# Patient Record
Sex: MALE | Race: OTHER | Employment: FULL TIME | URBAN - METROPOLITAN AREA
[De-identification: names, ages, dates, MRNs, and addresses within clinical notes are randomized per-mention and may not be internally consistent; named-entity substitution may affect disease eponyms.]

---

## 2018-02-25 ENCOUNTER — HOSPITAL ENCOUNTER (EMERGENCY)
Age: 23
Discharge: HOME | End: 2018-02-25
Attending: FAMILY MEDICINE

## 2018-02-25 VITALS
BODY MASS INDEX: 28.23 KG/M2 | TEMPERATURE: 98.3 F | DIASTOLIC BLOOD PRESSURE: 77 MMHG | SYSTOLIC BLOOD PRESSURE: 155 MMHG | WEIGHT: 220 LBS | OXYGEN SATURATION: 99 % | HEIGHT: 74 IN | HEART RATE: 74 BPM | RESPIRATION RATE: 16 BRPM

## 2018-02-25 DIAGNOSIS — R07.89 CHEST WALL PAIN: Primary | ICD-10-CM

## 2018-02-25 LAB
ALBUMIN SERPL-MCNC: 4.7 G/DL (ref 3.9–4.9)
ALP BLD-CCNC: 79 U/L (ref 35–104)
ALT SERPL-CCNC: 16 U/L (ref 0–41)
ANION GAP SERPL CALCULATED.3IONS-SCNC: 11 MEQ/L (ref 7–13)
ANISOCYTOSIS: ABNORMAL
AST SERPL-CCNC: 21 U/L (ref 0–40)
ATYPICAL LYMPHOCYTE RELATIVE PERCENT: 4 %
BASOPHILS ABSOLUTE: 0.1 K/UL (ref 0–0.2)
BASOPHILS RELATIVE PERCENT: 3 %
BILIRUB SERPL-MCNC: 0.4 MG/DL (ref 0–1.2)
BUN BLDV-MCNC: 10 MG/DL (ref 6–20)
CALCIUM SERPL-MCNC: 9.5 MG/DL (ref 8.6–10.2)
CHLORIDE BLD-SCNC: 104 MEQ/L (ref 98–107)
CK MB: 3.4 NG/ML (ref 0–6.7)
CO2: 26 MEQ/L (ref 22–29)
CREAT SERPL-MCNC: 0.95 MG/DL (ref 0.7–1.2)
CREATINE KINASE-MB INDEX: 0.9 % (ref 0–3.5)
EKG ATRIAL RATE: 71 BPM
EKG P AXIS: 58 DEGREES
EKG P-R INTERVAL: 156 MS
EKG Q-T INTERVAL: 378 MS
EKG QRS DURATION: 84 MS
EKG QTC CALCULATION (BAZETT): 410 MS
EKG R AXIS: 15 DEGREES
EKG T AXIS: 26 DEGREES
EKG VENTRICULAR RATE: 71 BPM
EOSINOPHILS ABSOLUTE: 0.1 K/UL (ref 0–0.7)
EOSINOPHILS RELATIVE PERCENT: 4 %
GFR AFRICAN AMERICAN: >60
GFR NON-AFRICAN AMERICAN: >60
GLOBULIN: 2.7 G/DL (ref 2.3–3.5)
GLUCOSE BLD-MCNC: 95 MG/DL (ref 74–109)
HCT VFR BLD CALC: 42.7 % (ref 42–52)
HEMOGLOBIN: 13.5 G/DL (ref 14–18)
HYPOCHROMIA: 0
LYMPHOCYTES ABSOLUTE: 1.1 K/UL (ref 1–4.8)
LYMPHOCYTES RELATIVE PERCENT: 26 %
MACROCYTES: 0
MCH RBC QN AUTO: 26.8 PG (ref 27–31.3)
MCHC RBC AUTO-ENTMCNC: 31.6 % (ref 33–37)
MCV RBC AUTO: 84.8 FL (ref 80–100)
MICROCYTES: 0
MONOCYTES ABSOLUTE: 0.3 K/UL (ref 0.2–0.8)
MONOCYTES RELATIVE PERCENT: 9.4 %
NEUTROPHILS ABSOLUTE: 2 K/UL (ref 1.4–6.5)
NEUTROPHILS RELATIVE PERCENT: 54 %
PDW BLD-RTO: 14.2 % (ref 11.5–14.5)
PLATELET # BLD: 150 K/UL (ref 130–400)
PLATELET SLIDE REVIEW: ADEQUATE
POIKILOCYTES: 0
POLYCHROMASIA: 0
POTASSIUM SERPL-SCNC: 4.9 MEQ/L (ref 3.5–5.1)
RBC # BLD: 5.03 M/UL (ref 4.7–6.1)
SLIDE REVIEW: ABNORMAL
SODIUM BLD-SCNC: 141 MEQ/L (ref 132–144)
TOTAL CK: 369 U/L (ref 0–190)
TOTAL PROTEIN: 7.4 G/DL (ref 6.4–8.1)
TROPONIN: <0.01 NG/ML (ref 0–0.01)
WBC # BLD: 3.7 K/UL (ref 4.8–10.8)

## 2018-02-25 PROCEDURE — 84484 ASSAY OF TROPONIN QUANT: CPT

## 2018-02-25 PROCEDURE — 93005 ELECTROCARDIOGRAM TRACING: CPT

## 2018-02-25 PROCEDURE — 82553 CREATINE MB FRACTION: CPT

## 2018-02-25 PROCEDURE — 2580000003 HC RX 258: Performed by: FAMILY MEDICINE

## 2018-02-25 PROCEDURE — 82550 ASSAY OF CK (CPK): CPT

## 2018-02-25 PROCEDURE — 80053 COMPREHEN METABOLIC PANEL: CPT

## 2018-02-25 PROCEDURE — 85025 COMPLETE CBC W/AUTO DIFF WBC: CPT

## 2018-02-25 PROCEDURE — 36415 COLL VENOUS BLD VENIPUNCTURE: CPT

## 2018-02-25 PROCEDURE — 99285 EMERGENCY DEPT VISIT HI MDM: CPT

## 2018-02-25 RX ORDER — 0.9 % SODIUM CHLORIDE 0.9 %
1000 INTRAVENOUS SOLUTION INTRAVENOUS ONCE
Status: COMPLETED | OUTPATIENT
Start: 2018-02-25 | End: 2018-02-25

## 2018-02-25 RX ORDER — NAPROXEN 500 MG/1
500 TABLET ORAL 2 TIMES DAILY
Qty: 60 TABLET | Refills: 0 | Status: SHIPPED | OUTPATIENT
Start: 2018-02-25 | End: 2018-11-24

## 2018-02-25 RX ADMIN — SODIUM CHLORIDE 1000 ML: 900 INJECTION, SOLUTION INTRAVENOUS at 14:13

## 2018-02-25 ASSESSMENT — PAIN DESCRIPTION - LOCATION: LOCATION: CHEST

## 2018-02-25 ASSESSMENT — ENCOUNTER SYMPTOMS
HEARTBURN: 0
TROUBLE SWALLOWING: 0
VOMITING: 0
ORTHOPNEA: 0
BACK PAIN: 0
SHORTNESS OF BREATH: 0
ABDOMINAL PAIN: 0
NAUSEA: 0
COUGH: 0

## 2018-02-25 ASSESSMENT — PAIN DESCRIPTION - ORIENTATION: ORIENTATION: LEFT

## 2018-02-25 ASSESSMENT — PAIN SCALES - GENERAL: PAINLEVEL_OUTOF10: 6

## 2018-02-25 ASSESSMENT — HEART SCORE: ECG: 0

## 2018-02-25 NOTE — ED PROVIDER NOTES
below, if available at the time of this note:    No orders to display         ED BEDSIDE ULTRASOUND:   Performed by ED Physician - none    LABS:  Labs Reviewed   CBC WITH AUTO DIFFERENTIAL - Abnormal; Notable for the following:        Result Value    WBC 3.7 (*)     Hemoglobin 13.5 (*)     MCH 26.8 (*)     MCHC 31.6 (*)     All other components within normal limits   CK - Abnormal; Notable for the following: Total  (*)     All other components within normal limits   TROPONIN   COMPREHENSIVE METABOLIC PANEL   CKMB & RELATIVE PERCENT       All other labs were within normal range or not returned as of this dictation. EMERGENCY DEPARTMENT COURSE and DIFFERENTIAL DIAGNOSIS/MDM:   Vitals:    Vitals:    02/25/18 1204 02/25/18 1414   BP: (!) 170/84 (!) 155/77   Pulse: 81 74   Resp: 18 16   Temp: 98.3 °F (36.8 °C)    TempSrc: Temporal    SpO2: 99% 99%   Weight: 220 lb (99.8 kg)    Height: 6' 2\" (1.88 m)        ED Course        Improved      MDM  Number of Diagnoses or Management Options  Chest wall pain: minor  Diagnosis management comments: 23years old male patient who had been having increased weight lifting and muscular activity in the last 2 weeks  Presents with left-sided chest pain reproducible on palpation slightly elevated CK giving IV fluid felt a lot better sent home with nonsteroidal around the inflammatory and advised to rest       Amount and/or Complexity of Data Reviewed  Clinical lab tests: ordered and reviewed  Tests in the radiology section of CPT®: ordered and reviewed         CONSULTS:  None    PROCEDURES:  Unless otherwise noted below, none     Procedures    FINAL IMPRESSION      1. Chest wall pain          DISPOSITION/PLAN   DISPOSITION Decision To Discharge 02/25/2018 02:48:56 PM      PATIENT REFERRED TO:  No follow-up provider specified.     DISCHARGE MEDICATIONS:  Discharge Medication List as of 2/25/2018  2:49 PM      START taking these medications    Details   naproxen (NAPROSYN) 500 MG tablet Take 1 tablet by mouth 2 times daily, Disp-60 tablet, R-0Print                (Please note that portions of this note were completed with a voice recognition program.  Efforts were made to edit the dictations but occasionally words are mis-transcribed.)    Shelley Lr MD (electronically signed)  Attending Emergency Physician          Jayden Young MD  02/25/18 [phone]

## 2018-11-24 ENCOUNTER — HOSPITAL ENCOUNTER (EMERGENCY)
Age: 23
Discharge: HOME | End: 2018-11-24

## 2018-11-24 ENCOUNTER — APPOINTMENT (OUTPATIENT)
Dept: GENERAL RADIOLOGY | Age: 23
End: 2018-11-24

## 2018-11-24 VITALS
RESPIRATION RATE: 16 BRPM | TEMPERATURE: 98.5 F | SYSTOLIC BLOOD PRESSURE: 139 MMHG | BODY MASS INDEX: 28.23 KG/M2 | HEIGHT: 74 IN | HEART RATE: 60 BPM | DIASTOLIC BLOOD PRESSURE: 68 MMHG | WEIGHT: 220 LBS | OXYGEN SATURATION: 98 %

## 2018-11-24 DIAGNOSIS — S39.012A STRAIN OF LUMBAR REGION, INITIAL ENCOUNTER: Primary | ICD-10-CM

## 2018-11-24 PROCEDURE — 96372 THER/PROPH/DIAG INJ SC/IM: CPT

## 2018-11-24 PROCEDURE — 72110 X-RAY EXAM L-2 SPINE 4/>VWS: CPT

## 2018-11-24 PROCEDURE — 99283 EMERGENCY DEPT VISIT LOW MDM: CPT

## 2018-11-24 PROCEDURE — 6360000002 HC RX W HCPCS: Performed by: PHYSICIAN ASSISTANT

## 2018-11-24 RX ORDER — CYCLOBENZAPRINE HCL 10 MG
10 TABLET ORAL 3 TIMES DAILY PRN
Qty: 21 TABLET | Refills: 0 | Status: SHIPPED | OUTPATIENT
Start: 2018-11-24 | End: 2018-12-01

## 2018-11-24 RX ORDER — NAPROXEN 500 MG/1
500 TABLET ORAL 2 TIMES DAILY
Qty: 60 TABLET | Refills: 0 | Status: SHIPPED | OUTPATIENT
Start: 2018-11-24

## 2018-11-24 RX ORDER — KETOROLAC TROMETHAMINE 30 MG/ML
30 INJECTION, SOLUTION INTRAMUSCULAR; INTRAVENOUS ONCE
Status: COMPLETED | OUTPATIENT
Start: 2018-11-24 | End: 2018-11-24

## 2018-11-24 RX ADMIN — KETOROLAC TROMETHAMINE 30 MG: 30 INJECTION, SOLUTION INTRAMUSCULAR at 08:37

## 2018-11-24 ASSESSMENT — PAIN DESCRIPTION - ORIENTATION: ORIENTATION: RIGHT

## 2018-11-24 ASSESSMENT — PAIN DESCRIPTION - PAIN TYPE: TYPE: ACUTE PAIN

## 2018-11-24 ASSESSMENT — PAIN SCALES - GENERAL
PAINLEVEL_OUTOF10: 3
PAINLEVEL_OUTOF10: 5
PAINLEVEL_OUTOF10: 5

## 2018-11-24 ASSESSMENT — PAIN DESCRIPTION - DESCRIPTORS: DESCRIPTORS: ACHING;BURNING;SHARP;SHOOTING

## 2018-11-24 ASSESSMENT — PAIN DESCRIPTION - ONSET: ONSET: ON-GOING

## 2018-11-24 ASSESSMENT — PAIN DESCRIPTION - PROGRESSION: CLINICAL_PROGRESSION: GRADUALLY WORSENING

## 2018-11-24 ASSESSMENT — PAIN DESCRIPTION - LOCATION: LOCATION: BACK

## 2018-11-24 ASSESSMENT — PAIN DESCRIPTION - FREQUENCY: FREQUENCY: CONTINUOUS

## 2018-11-25 ASSESSMENT — ENCOUNTER SYMPTOMS
ABDOMINAL PAIN: 0
BACK PAIN: 1
SORE THROAT: 0
COLOR CHANGE: 0
ABDOMINAL DISTENTION: 0
EYE DISCHARGE: 0
RHINORRHEA: 0
CONSTIPATION: 0
SHORTNESS OF BREATH: 0